# Patient Record
Sex: MALE
[De-identification: names, ages, dates, MRNs, and addresses within clinical notes are randomized per-mention and may not be internally consistent; named-entity substitution may affect disease eponyms.]

---

## 2018-08-22 ENCOUNTER — HOSPITAL ENCOUNTER (EMERGENCY)
Dept: HOSPITAL 14 - H.ER | Age: 48
Discharge: HOME | End: 2018-08-22
Payer: COMMERCIAL

## 2018-08-22 VITALS
OXYGEN SATURATION: 96 % | RESPIRATION RATE: 18 BRPM | DIASTOLIC BLOOD PRESSURE: 91 MMHG | HEART RATE: 95 BPM | TEMPERATURE: 98.2 F | SYSTOLIC BLOOD PRESSURE: 141 MMHG

## 2018-08-22 VITALS — BODY MASS INDEX: 32.3 KG/M2

## 2018-08-22 DIAGNOSIS — Z85.818: ICD-10-CM

## 2018-08-22 DIAGNOSIS — I10: ICD-10-CM

## 2018-08-22 DIAGNOSIS — G35: ICD-10-CM

## 2018-08-22 DIAGNOSIS — R07.9: Primary | ICD-10-CM

## 2018-08-22 LAB
ALBUMIN SERPL-MCNC: 4.7 G/DL (ref 3.5–5)
ALBUMIN/GLOB SERPL: 1.6 {RATIO} (ref 1–2.1)
ALT SERPL-CCNC: 43 U/L (ref 21–72)
AST SERPL-CCNC: 38 U/L (ref 17–59)
BASOPHILS # BLD AUTO: 0 K/UL (ref 0–0.2)
BASOPHILS NFR BLD: 1.1 % (ref 0–2)
BUN SERPL-MCNC: 10 MG/DL (ref 9–20)
CALCIUM SERPL-MCNC: 10.1 MG/DL (ref 8.4–10.2)
EOSINOPHIL # BLD AUTO: 0.1 K/UL (ref 0–0.7)
EOSINOPHIL NFR BLD: 1.9 % (ref 0–4)
ERYTHROCYTE [DISTWIDTH] IN BLOOD BY AUTOMATED COUNT: 12.8 % (ref 11.5–14.5)
GFR NON-AFRICAN AMERICAN: > 60
HGB BLD-MCNC: 16.1 G/DL (ref 12–18)
LYMPHOCYTES # BLD AUTO: 1.2 K/UL (ref 1–4.3)
LYMPHOCYTES NFR BLD AUTO: 26.2 % (ref 20–40)
MCH RBC QN AUTO: 32.2 PG (ref 27–31)
MCHC RBC AUTO-ENTMCNC: 34.6 G/DL (ref 33–37)
MCV RBC AUTO: 93.1 FL (ref 80–94)
MONOCYTES # BLD: 0.6 K/UL (ref 0–0.8)
MONOCYTES NFR BLD: 12.1 % (ref 0–10)
NEUTROPHILS # BLD: 2.7 K/UL (ref 1.8–7)
NEUTROPHILS NFR BLD AUTO: 58.7 % (ref 50–75)
NRBC BLD AUTO-RTO: 0.1 % (ref 0–0)
PLATELET # BLD: 190 K/UL (ref 130–400)
PMV BLD AUTO: 8.6 FL (ref 7.2–11.7)
RBC # BLD AUTO: 4.99 MIL/UL (ref 4.4–5.9)
WBC # BLD AUTO: 4.7 K/UL (ref 4.8–10.8)

## 2018-08-22 NOTE — CARD
--------------- APPROVED REPORT --------------





Date of service: 08/22/2018



EKG Measurement

Heart [id number]

IA [id number]

[id number]

[id number]

[id number]



<Conclusion>

Sinus rhythm with marked sinus arrhythmia

Otherwise normal ECG

## 2018-08-22 NOTE — ED PDOC
HPI: Chest Pain


Time Seen by Provider: 08/22/18 09:11


History Per: Patient


Onset/Duration Of Symptoms: Days (3)


Current Symptoms Are (Timing): Still Present


Severity: Moderate


Quality: Pressure


Associated Symptoms: Diaphoresis


Modifying Factors: None


Exacerbating Factors: None


Additional Complaint(s): 





Left sided chest pressure radiating to left arm x 3 days. Worse this AM. Assoc 

with diaphoresis. Denies SOB.





Past Medical History


Vital Signs: 


 Last Vital Signs











Temp  98.4 F   08/22/18 09:16


 


Pulse  78   08/22/18 13:01


 


Resp  14   08/22/18 10:19


 


BP  151/101 H  08/22/18 13:01


 


Pulse Ox  99   08/22/18 11:10














- Medical History


PMH: Multiple Sclerosis


Other PMH: MS, Ca of tonsils and lymph nodes





- Family History


Family History: States: Unknown Family Hx





- Home Medications


Home Medications: 


 Ambulatory Orders











 Medication  Instructions  Recorded


 


Cholecalciferol (Vitamin D3) 5,000 unit PO DAILY 08/22/18





[Vitamin D3]  


 


Cyanocobalamin (Vitamin B-12) 1,000 mcg PO DAILY 08/22/18





[Vitamin B-12]  


 


Finasteride [Propecia] 1 mg PO HS 08/22/18


 


Garlic [Odorless Garlic] 500 mg PO DAILY 08/22/18


 


Ginseng [Ginseng] 200 mg PO DAILY 08/22/18


 


Glatiramer Acetate [Glatopa] 20 mg SC DAILY 08/22/18


 


Lutein [Lutein] 40 mg PO DAILY 08/22/18


 


Multivitamin/Iron/Folic Acid 1 tab PO DAILY 08/22/18





[Centrum Complete Multivit Tab]  


 


NIFEdipine [Procardia] 10 mg PO DAILY #30 sgl 08/22/18


 


Non-Formulary 1 ea .ROUTE Q6 #1 ea 08/22/18


 


Summit-3S/Dha/Epa/Fish Oil [Fish 1 cap PO DAILY 08/22/18





Oil 1,200 mg Softgel]  


 


Ubidecarenone [Coenzyme Q10] 100 mg PO DAILY 08/22/18














- Allergies


Allergies/Adverse Reactions: 


 Allergies











Allergy/AdvReac Type Severity Reaction Status Date / Time


 


No Known Allergies Allergy   Verified 08/22/18 09:26














Review of Systems


ROS Statement: Except As Marked, All Systems Reviewed And Found Negative


Cardiovascular: Positive for: Chest Pain





Physical Exam





- Reviewed


Nursing Documentation Reviewed: Yes


Vital Signs Reviewed: Yes





- Physical Exam


Appears: Positive for: Non-toxic, No Acute Distress


Head Exam: Positive for: ATRAUMATIC, NORMAL INSPECTION, NORMOCEPHALIC


Skin: Positive for: Normal Color, Warm, DRY


Eye Exam: Positive for: EOMI, Normal appearance, PERRL


ENT: Positive for: Normal ENT Inspection


Neck: Positive for: Normal, Painless ROM


Cardiovascular/Chest: Positive for: Regular Rate, Rhythm


Respiratory: Positive for: CNT, Normal Breath Sounds


Gastrointestinal/Abdominal: Positive for: Normal Exam, Soft


Back: Positive for: Normal Inspection


Extremity: Positive for: Normal ROM


Neurologic/Psych: Positive for: Alert, Oriented





- Laboratory Results


Result Diagrams: 


 08/22/18 09:41





 08/22/18 09:41





- ECG


O2 Sat by Pulse Oximetry: 99





Medical Decision Making


Medical Decision Making: 


Time: 1052


CXR RESULTS


FINDINGS:





LINES AND TUBES:


None. 





LUNG AND PLEURA:


The lungs are well inflated. There is linear atelectasis/ scarring in the left 

lung base. No focal consolidation. No pleural effusion or pneumothorax.





HEART AND MEDIASTINUM:


The heart is not enlarged. The hilar and mediastinal contours are within normal 

limits.





SKELETAL STRUCTURES:


The bony structures are within normal limits for the patient's age.





VISUALIZED UPPER ABDOMEN:


Normal.





OTHER FINDINGS:


None.





IMPRESSION:


No active pulmonary disease.





Re-eval


Chest pain free /91 after Procardia 10 mg


Advised 24 hr obs for chest pain and BP tx. Pt wishes to go home Aware of risks 

including MI Stroke and death





Disposition





- Clinical Impression


Clinical Impression: 


 Chest pain, Hypertension








- Patient ED Disposition


Is Patient to be Admitted: No


Counseled Patient/Family Regarding: Studies Performed, Diagnosis, Need For 

Followup, Rx Given





- Disposition


Disposition: Routine/Home


Disposition Time: 14:12


Condition: FAIR


Prescriptions: 


NIFEdipine [Procardia] 10 mg PO DAILY #30 sgl


Non-Formulary 1 ea .ROUTE Q6 #1 ea


Instructions:  High Blood Pressure in Adults, Chest Pain


Forms:  [facility] ED School/Work Excuse

## 2018-08-22 NOTE — RAD
HISTORY:



COMPARISON:

No prior.



TECHNIQUE:

Chest PA and lateral



FINDINGS:



LINES AND TUBES:

None. 



LUNG AND PLEURA:

The lungs are well inflated. There is linear atelectasis/ scarring in 

the left lung base. No focal consolidation. No pleural effusion or 

pneumothorax.



HEART AND MEDIASTINUM:

The heart is not enlarged. The hilar and mediastinal contours are 

within normal limits.



SKELETAL STRUCTURES:

The bony structures are within normal limits for the patient's age.



VISUALIZED UPPER ABDOMEN:

Normal.



OTHER FINDINGS:

None.



IMPRESSION:

No active pulmonary disease.

## 2019-11-04 PROBLEM — Z00.00 ENCOUNTER FOR PREVENTIVE HEALTH EXAMINATION: Status: ACTIVE | Noted: 2019-11-04

## 2019-11-05 ENCOUNTER — APPOINTMENT (OUTPATIENT)
Dept: OTOLARYNGOLOGY | Facility: CLINIC | Age: 49
End: 2019-11-05
Payer: COMMERCIAL

## 2019-11-05 DIAGNOSIS — Z87.891 PERSONAL HISTORY OF NICOTINE DEPENDENCE: ICD-10-CM

## 2019-11-05 DIAGNOSIS — Z85.9 PERSONAL HISTORY OF MALIGNANT NEOPLASM, UNSPECIFIED: ICD-10-CM

## 2019-11-05 PROCEDURE — 31575 DIAGNOSTIC LARYNGOSCOPY: CPT

## 2019-11-05 PROCEDURE — 99212 OFFICE O/P EST SF 10 MIN: CPT | Mod: 25

## 2019-11-05 RX ORDER — PNV NO.95/FERROUS FUM/FOLIC AC 28MG-0.8MG
TABLET ORAL
Refills: 0 | Status: ACTIVE | COMMUNITY

## 2019-11-05 RX ORDER — UBIDECARENONE/VIT E ACET 100MG-5
CAPSULE ORAL
Refills: 0 | Status: ACTIVE | COMMUNITY

## 2019-11-05 NOTE — CONSULT LETTER
[Dear  ___] : Dear  [unfilled], [Courtesy Letter:] : I had the pleasure of seeing your patient, [unfilled], in my office today. [Please see my note below.] : Please see my note below. [Sincerely,] : Sincerely, [FreeTextEntry3] : Chance Yun MD\par [facility]- Co-Director\par [facility]

## 2019-11-05 NOTE — HISTORY OF PRESENT ILLNESS
[de-identified] : Now proximally 5-1/2 years status post treatment for an oropharynx HPV positive squamous cell carcinoma.\par He is without any complaints.\par His PET scan in 2018 was negative for tumor.

## 2019-11-05 NOTE — ASSESSMENT
[FreeTextEntry1] : Is clinically stable. He has no evidence of disease. He is cured from his tumor. He does not need any imaging this year. He was seen in follow up in one year for surveillance.

## 2019-11-05 NOTE — PROCEDURE
[de-identified] : PROCEDURE: Flexible laryngoscopy\par SURGEON: Dr. Yun\par INDICATIONS: He was unable to tolerate a mirror exam. Assess for laryngopharynx pharyngeal reflux. cough. head and neck mass. \par ANESTHESIA: The patient was placed in a sitting position.  Following application of the topical anesthetic and decongestant, exam was performed with a flexible scope.  The scope was passed along the right nasal floor to the nasopharynx.  It was then passed into the region of the middle meatus, middle turbinate, and sphenoethmoid region.  An identical procedure was performed on the left side.  The following findings were noted:\par \par The nasal musoca was healthy appearing and the septum was roughly midline. The middle meatus and sphenoethmoid recesses were clear bilaterally. The nasopharynx \par \par Nasopharynx: no masses, choanae patent, no adenoid tissue\par \par Base of tongue/vallecula: no masses or asymmetry\par Pharyngeal walls: symmetrical. No masses\par Pyriform sinuses: no lesions or pooling of secretions\par Epiglottis: normal. No edema or lesions\par Aryepiglottic folds: normal. No lesions. \par Vocal cords: clear and mobile. No lesions. Airway patent\par Arytenoids: no edema or erythema \par Interarytenoid area: no edema, erythema or lesion.\par

## 2020-12-08 ENCOUNTER — APPOINTMENT (OUTPATIENT)
Dept: OTOLARYNGOLOGY | Facility: CLINIC | Age: 50
End: 2020-12-08

## 2021-05-02 ENCOUNTER — NON-APPOINTMENT (OUTPATIENT)
Age: 51
End: 2021-05-02

## 2021-05-04 ENCOUNTER — APPOINTMENT (OUTPATIENT)
Dept: OTOLARYNGOLOGY | Facility: CLINIC | Age: 51
End: 2021-05-04
Payer: COMMERCIAL

## 2021-05-04 VITALS — BODY MASS INDEX: 27.32 KG/M2 | HEIGHT: 66 IN | WEIGHT: 170 LBS | TEMPERATURE: 97.3 F

## 2021-05-04 DIAGNOSIS — H69.80 OTHER SPECIFIED DISORDERS OF EUSTACHIAN TUBE, UNSPECIFIED EAR: ICD-10-CM

## 2021-05-04 PROCEDURE — 99072 ADDL SUPL MATRL&STAF TM PHE: CPT

## 2021-05-04 PROCEDURE — 99213 OFFICE O/P EST LOW 20 MIN: CPT | Mod: 25

## 2021-05-04 PROCEDURE — 31575 DIAGNOSTIC LARYNGOSCOPY: CPT

## 2021-05-04 RX ORDER — CLONAZEPAM 2 MG/1
TABLET ORAL
Refills: 0 | Status: ACTIVE | COMMUNITY

## 2021-05-04 RX ORDER — GLATIRAMER ACETATE 20 MG/ML
20 SYRINGE KIT (EA) SUBCUTANEOUS
Refills: 0 | Status: DISCONTINUED | COMMUNITY
End: 2021-05-04

## 2021-05-04 NOTE — HISTORY OF PRESENT ILLNESS
[de-identified] : He is seen in followup today.\par He is now greater than 7 years status post treatment for HPV positive oropharynx squamous cell carcinoma.\par He had  a PET scan of 2018 that was negative.\par \par Today he presents for his yearly surveillance.\par He also reports that he has had pressure in his left ear.  This has been long standing.

## 2021-05-04 NOTE — CONSULT LETTER
[Dear  ___] : Dear  [unfilled], [Courtesy Letter:] : I had the pleasure of seeing your patient, [unfilled], in my office today. [Please see my note below.] : Please see my note below. [Sincerely,] : Sincerely, [FreeTextEntry3] : Chance Yun MD\par [facility]- Co-Director\par [facility]

## 2021-05-04 NOTE — ASSESSMENT
[FreeTextEntry1] : He has no evidence of disease from his previously treated head and neck cancer.\par His left-sided ear symptoms I believe her from eustachian tube dysfunction secondary to radiation therapy. There is no evidence of fluid.The firmness of his left neck has been present and was studiedin 2018 with a negative PET scan\par \par He showed me recent blood work from Dr. Nunez's that demonstrate an elevated TSH level.\par He has followup tomorrow with Dr. Nunez\par I suspect more thyroid functions will be performed. This may be from late radiation Side effects causing hypothyroidism.\par \par Followup one year.

## 2022-07-25 ENCOUNTER — NON-APPOINTMENT (OUTPATIENT)
Age: 52
End: 2022-07-25

## 2022-07-28 ENCOUNTER — APPOINTMENT (OUTPATIENT)
Dept: OTOLARYNGOLOGY | Facility: CLINIC | Age: 52
End: 2022-07-28

## 2022-07-28 VITALS — HEIGHT: 66 IN | WEIGHT: 155 LBS | BODY MASS INDEX: 24.91 KG/M2

## 2022-07-28 DIAGNOSIS — Z85.819 PERSONAL HISTORY OF MALIGNANT NEOPLASM OF UNSPECIFIED SITE OF LIP, ORAL CAVITY, AND PHARYNX: ICD-10-CM

## 2022-07-28 PROCEDURE — 99213 OFFICE O/P EST LOW 20 MIN: CPT | Mod: 25

## 2022-07-28 PROCEDURE — 31575 DIAGNOSTIC LARYNGOSCOPY: CPT

## 2022-07-28 RX ORDER — CLONAZEPAM 0.5 MG/1
0.5 TABLET ORAL
Qty: 90 | Refills: 0 | Status: ACTIVE | COMMUNITY
Start: 2022-07-26

## 2022-07-28 RX ORDER — NIFEDIPINE 20 MG/1
CAPSULE ORAL
Refills: 0 | Status: ACTIVE | COMMUNITY

## 2022-07-28 RX ORDER — NIFEDIPINE 30 MG/1
30 TABLET, FILM COATED, EXTENDED RELEASE ORAL
Qty: 30 | Refills: 0 | Status: ACTIVE | COMMUNITY
Start: 2022-04-25

## 2022-07-28 RX ORDER — ZINC SULFATE 50(220)MG
CAPSULE ORAL
Refills: 0 | Status: ACTIVE | COMMUNITY

## 2022-07-28 NOTE — CONSULT LETTER
[Dear  ___] : Dear  [unfilled], [Courtesy Letter:] : I had the pleasure of seeing your patient, [unfilled], in my office today. [Please see my note below.] : Please see my note below. [Sincerely,] : Sincerely, [FreeTextEntry3] : Chance Yun MD\par [facility]- Co-Director\par [facility]

## 2022-07-28 NOTE — HISTORY OF PRESENT ILLNESS
[de-identified] : Now 8 years status post nonsurgical treatment for an HPV oropharynx cancer.  He is without any complaints today.  He is here for surveillance.

## 2022-07-28 NOTE — PROCEDURE
[de-identified] : PROCEDURE: Flexible laryngoscopy\par SURGEON: Dr. Yun\par INDICATIONS: He was unable to tolerate a mirror exam. Assess for laryngopharynx pharyngeal reflux. cough. head and neck mass. \par ANESTHESIA: The patient was placed in a sitting position.  Following application of the topical anesthetic and decongestant, exam was performed with a flexible scope.  The scope was passed along the right nasal floor to the nasopharynx.  It was then passed into the region of the middle meatus, middle turbinate, and sphenoethmoid region.  An identical procedure was performed on the left side.  The following findings were noted:\par \par The nasal musoca was healthy appearing and the septum was roughly midline. The middle meatus and sphenoethmoid recesses were clear bilaterally. The nasopharynx \par \par Nasopharynx: no masses, choanae patent, no adenoid tissue\par \par Base of tongue/vallecula: no masses or asymmetry\par Pharyngeal walls: symmetrical. No masses.\par Pyriform sinuses: no lesions or pooling of secretions.\par Epiglottis: normal. No edema or lesions.\par Aryepiglottic folds: normal. No lesions. \par Vocal cords: clear and mobile. No lesions. Airway patent.\par Arytenoids: no edema or erythema. \par Interarytenoid area: no edema, erythema or lesion.\par

## 2022-07-28 NOTE — ASSESSMENT
[FreeTextEntry1] : Clinically stable.  No evidence of disease.  No imaging necessary.  Follow-up in 1 year.

## 2023-08-29 ENCOUNTER — APPOINTMENT (OUTPATIENT)
Dept: OTOLARYNGOLOGY | Facility: CLINIC | Age: 53
End: 2023-08-29
Payer: COMMERCIAL

## 2023-08-29 VITALS — WEIGHT: 170 LBS | BODY MASS INDEX: 27.32 KG/M2 | HEIGHT: 66 IN

## 2023-08-29 DIAGNOSIS — R07.0 PAIN IN THROAT: ICD-10-CM

## 2023-08-29 PROCEDURE — 31575 DIAGNOSTIC LARYNGOSCOPY: CPT

## 2023-08-29 PROCEDURE — 99213 OFFICE O/P EST LOW 20 MIN: CPT | Mod: 25

## 2023-08-29 RX ORDER — AMOXICILLIN AND CLAVULANATE POTASSIUM 875; 125 MG/1; MG/1
875-125 TABLET, COATED ORAL
Qty: 20 | Refills: 1 | Status: ACTIVE | COMMUNITY
Start: 2023-08-29 | End: 1900-01-01

## 2023-08-29 RX ORDER — HYDROCODONE BITARTRATE AND ACETAMINOPHEN 5; 325 MG/1; MG/1
5-325 TABLET ORAL
Qty: 20 | Refills: 0 | Status: DISCONTINUED | COMMUNITY
Start: 2022-03-10 | End: 2023-08-29

## 2023-08-29 RX ORDER — AMOXICILLIN 875 MG/1
875 TABLET, FILM COATED ORAL
Qty: 20 | Refills: 0 | Status: DISCONTINUED | COMMUNITY
Start: 2022-03-10 | End: 2023-08-29

## 2023-08-29 NOTE — PROCEDURE
[de-identified] : PROCEDURE: Flexible laryngoscopy SURGEON: Dr. Yun INDICATIONS: He was unable to tolerate a mirror exam. Assess for laryngopharynx pharyngeal reflux. cough. head and neck mass.  ANESTHESIA: The patient was placed in a sitting position.  Following application of the topical anesthetic and decongestant, exam was performed with a flexible scope.  The scope was passed along the right nasal floor to the nasopharynx.  It was then passed into the region of the middle meatus, middle turbinate, and sphenoethmoid region.  An identical procedure was performed on the left side.  The following findings were noted:  The nasal musoca was healthy appearing and the septum was roughly midline. The middle meatus and sphenoethmoid recesses were clear bilaterally. The nasopharynx   Nasopharynx: no masses, choanae patent, no adenoid tissue  Base of tongue/vallecula: no masses or asymmetry Pharyngeal walls: symmetrical. No masses. Pyriform sinuses: no lesions or pooling of secretions. Epiglottis: normal. No edema or lesions. Aryepiglottic folds: normal. No lesions.  Vocal cords: clear and mobile. No lesions. Airway patent. Arytenoids: no edema or erythema.  Interarytenoid area: no edema, erythema or lesion.

## 2023-08-29 NOTE — HISTORY OF PRESENT ILLNESS
[de-identified] : Very well-known to me. He presents today reporting that for the last week he has significant pain in his submental area. No fever. This developed spontaneously. No other head neck complaints.

## 2023-08-29 NOTE — ASSESSMENT
[FreeTextEntry1] : My differential diagnosis includes but is not limited to a reactive lymph node or lymphadenitis with less likely the possibility of a other mass of unknown origin that is infected. I am recommending NSAIDs, warm compresses and Augmentin. Follow-up in 10 to 14 days.

## 2023-08-29 NOTE — PHYSICAL EXAM
[TextEntry] : Looks very healthy. Afebrile. Intraoral exam normal. Floor mouth soft. He has a 2 to 3 cm submental firm tender mass.  No other lymphadenopathy

## 2023-09-12 ENCOUNTER — APPOINTMENT (OUTPATIENT)
Dept: OTOLARYNGOLOGY | Facility: CLINIC | Age: 53
End: 2023-09-12
Payer: COMMERCIAL

## 2023-09-12 VITALS — HEIGHT: 66 IN | BODY MASS INDEX: 27.32 KG/M2 | WEIGHT: 170 LBS

## 2023-09-12 DIAGNOSIS — L04.9 ACUTE LYMPHADENITIS, UNSPECIFIED: ICD-10-CM

## 2023-09-12 PROCEDURE — 99213 OFFICE O/P EST LOW 20 MIN: CPT

## 2023-09-18 PROBLEM — L04.9 LYMPHADENITIS, ACUTE: Status: ACTIVE | Noted: 2023-08-29

## 2024-08-08 ENCOUNTER — APPOINTMENT (OUTPATIENT)
Dept: OTOLARYNGOLOGY | Facility: CLINIC | Age: 54
End: 2024-08-08

## 2024-08-08 ENCOUNTER — NON-APPOINTMENT (OUTPATIENT)
Age: 54
End: 2024-08-08

## 2024-08-08 PROBLEM — R25.2 TRISMUS: Status: ACTIVE | Noted: 2024-08-08

## 2024-08-08 PROCEDURE — 99213 OFFICE O/P EST LOW 20 MIN: CPT

## 2024-08-08 NOTE — ASSESSMENT
[FreeTextEntry1] : I do not see any evidence of a lesion. However secondary to his previous history and now history of trismus and tongue discomfort to be sent for CT scan imaging and be seen in follow-up.

## 2024-08-08 NOTE — HISTORY OF PRESENT ILLNESS
[de-identified] : Very well-known to me.  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  He presents today reporting that 6 to 8 weeks ago he felt some tongue and cheek discomfort.  Was seen by his dentist.  He reports he is getting a lot of dental work.  According to him the dentist treated with him an antibiotic. Robert in follow-up and referred here.  Now complaining of trismus. No hemoptysis or bright red blood per oral cavity. No dysphagia or unexplained weight loss.

## 2024-08-16 ENCOUNTER — APPOINTMENT (OUTPATIENT)
Dept: CT IMAGING | Facility: CLINIC | Age: 54
End: 2024-08-16
Payer: COMMERCIAL

## 2024-08-16 ENCOUNTER — TRANSCRIPTION ENCOUNTER (OUTPATIENT)
Age: 54
End: 2024-08-16

## 2024-08-16 PROCEDURE — 70491 CT SOFT TISSUE NECK W/DYE: CPT | Mod: 26
